# Patient Record
Sex: FEMALE | Race: WHITE | Employment: FULL TIME | URBAN - METROPOLITAN AREA
[De-identification: names, ages, dates, MRNs, and addresses within clinical notes are randomized per-mention and may not be internally consistent; named-entity substitution may affect disease eponyms.]

---

## 2021-12-29 ENCOUNTER — APPOINTMENT (OUTPATIENT)
Dept: GENERAL RADIOLOGY | Age: 26
End: 2021-12-29
Payer: COMMERCIAL

## 2021-12-29 ENCOUNTER — HOSPITAL ENCOUNTER (EMERGENCY)
Age: 26
Discharge: HOME | End: 2021-12-29
Attending: EMERGENCY MEDICINE
Payer: COMMERCIAL

## 2021-12-29 VITALS
SYSTOLIC BLOOD PRESSURE: 125 MMHG | HEIGHT: 62 IN | WEIGHT: 103 LBS | RESPIRATION RATE: 18 BRPM | BODY MASS INDEX: 18.95 KG/M2 | DIASTOLIC BLOOD PRESSURE: 85 MMHG | OXYGEN SATURATION: 99 % | HEART RATE: 80 BPM | TEMPERATURE: 98 F

## 2021-12-29 DIAGNOSIS — S93.602A FOOT SPRAIN, LEFT, INITIAL ENCOUNTER: ICD-10-CM

## 2021-12-29 DIAGNOSIS — R68.89 FLU-LIKE SYMPTOMS: ICD-10-CM

## 2021-12-29 DIAGNOSIS — R55 SYNCOPE AND COLLAPSE: Primary | ICD-10-CM

## 2021-12-29 LAB
ANION GAP SERPL CALCULATED.3IONS-SCNC: 10 MMOL/L (ref 3–16)
BASOPHILS ABSOLUTE: 0 K/UL (ref 0–0.2)
BASOPHILS RELATIVE PERCENT: 0.8 %
BUN BLDV-MCNC: 11 MG/DL (ref 7–20)
CALCIUM SERPL-MCNC: 8.9 MG/DL (ref 8.3–10.6)
CHLORIDE BLD-SCNC: 107 MMOL/L (ref 99–110)
CO2: 24 MMOL/L (ref 21–32)
CREAT SERPL-MCNC: 0.5 MG/DL (ref 0.6–1.1)
EKG ATRIAL RATE: 71 BPM
EKG DIAGNOSIS: NORMAL
EKG P AXIS: -4 DEGREES
EKG P-R INTERVAL: 148 MS
EKG Q-T INTERVAL: 362 MS
EKG QRS DURATION: 88 MS
EKG QTC CALCULATION (BAZETT): 393 MS
EKG R AXIS: 257 DEGREES
EKG T AXIS: 53 DEGREES
EKG VENTRICULAR RATE: 71 BPM
EOSINOPHILS ABSOLUTE: 0.1 K/UL (ref 0–0.6)
EOSINOPHILS RELATIVE PERCENT: 1 %
GFR AFRICAN AMERICAN: >60
GFR NON-AFRICAN AMERICAN: >60
GLUCOSE BLD-MCNC: 86 MG/DL (ref 70–99)
HCG QUALITATIVE: NEGATIVE
HCT VFR BLD CALC: 39.6 % (ref 36–48)
HEMOGLOBIN: 13.4 G/DL (ref 12–16)
LYMPHOCYTES ABSOLUTE: 1.5 K/UL (ref 1–5.1)
LYMPHOCYTES RELATIVE PERCENT: 25.1 %
MCH RBC QN AUTO: 31.5 PG (ref 26–34)
MCHC RBC AUTO-ENTMCNC: 33.7 G/DL (ref 31–36)
MCV RBC AUTO: 93.5 FL (ref 80–100)
MONOCYTES ABSOLUTE: 0.5 K/UL (ref 0–1.3)
MONOCYTES RELATIVE PERCENT: 9 %
NEUTROPHILS ABSOLUTE: 3.7 K/UL (ref 1.7–7.7)
NEUTROPHILS RELATIVE PERCENT: 64.1 %
PDW BLD-RTO: 13.7 % (ref 12.4–15.4)
PLATELET # BLD: 238 K/UL (ref 135–450)
PMV BLD AUTO: 7 FL (ref 5–10.5)
POTASSIUM REFLEX MAGNESIUM: 4.2 MMOL/L (ref 3.5–5.1)
RBC # BLD: 4.24 M/UL (ref 4–5.2)
SODIUM BLD-SCNC: 141 MMOL/L (ref 136–145)
WBC # BLD: 5.8 K/UL (ref 4–11)

## 2021-12-29 PROCEDURE — U0003 INFECTIOUS AGENT DETECTION BY NUCLEIC ACID (DNA OR RNA); SEVERE ACUTE RESPIRATORY SYNDROME CORONAVIRUS 2 (SARS-COV-2) (CORONAVIRUS DISEASE [COVID-19]), AMPLIFIED PROBE TECHNIQUE, MAKING USE OF HIGH THROUGHPUT TECHNOLOGIES AS DESCRIBED BY CMS-2020-01-R: HCPCS

## 2021-12-29 PROCEDURE — 80048 BASIC METABOLIC PNL TOTAL CA: CPT

## 2021-12-29 PROCEDURE — 73630 X-RAY EXAM OF FOOT: CPT

## 2021-12-29 PROCEDURE — 96374 THER/PROPH/DIAG INJ IV PUSH: CPT

## 2021-12-29 PROCEDURE — 84703 CHORIONIC GONADOTROPIN ASSAY: CPT

## 2021-12-29 PROCEDURE — 6360000002 HC RX W HCPCS: Performed by: EMERGENCY MEDICINE

## 2021-12-29 PROCEDURE — 93005 ELECTROCARDIOGRAM TRACING: CPT | Performed by: EMERGENCY MEDICINE

## 2021-12-29 PROCEDURE — 99284 EMERGENCY DEPT VISIT MOD MDM: CPT

## 2021-12-29 PROCEDURE — U0005 INFEC AGEN DETEC AMPLI PROBE: HCPCS

## 2021-12-29 PROCEDURE — 85025 COMPLETE CBC W/AUTO DIFF WBC: CPT

## 2021-12-29 PROCEDURE — 73610 X-RAY EXAM OF ANKLE: CPT

## 2021-12-29 RX ORDER — KETOROLAC TROMETHAMINE 30 MG/ML
15 INJECTION, SOLUTION INTRAMUSCULAR; INTRAVENOUS ONCE
Status: COMPLETED | OUTPATIENT
Start: 2021-12-29 | End: 2021-12-29

## 2021-12-29 RX ORDER — NAPROXEN 500 MG/1
500 TABLET ORAL 2 TIMES DAILY WITH MEALS
Qty: 28 TABLET | Refills: 0 | Status: SHIPPED | OUTPATIENT
Start: 2021-12-29 | End: 2022-03-16 | Stop reason: ALTCHOICE

## 2021-12-29 RX ADMIN — KETOROLAC TROMETHAMINE 15 MG: 30 INJECTION, SOLUTION INTRAMUSCULAR; INTRAVENOUS at 19:55

## 2021-12-29 ASSESSMENT — PAIN SCALES - GENERAL
PAINLEVEL_OUTOF10: 6
PAINLEVEL_OUTOF10: 6

## 2021-12-29 ASSESSMENT — ENCOUNTER SYMPTOMS
RHINORRHEA: 1
SHORTNESS OF BREATH: 0
VOMITING: 0
EYES NEGATIVE: 1
ABDOMINAL PAIN: 0
COUGH: 0
RESPIRATORY NEGATIVE: 1
GASTROINTESTINAL NEGATIVE: 1
NAUSEA: 0

## 2021-12-29 ASSESSMENT — PAIN DESCRIPTION - PAIN TYPE: TYPE: ACUTE PAIN

## 2021-12-29 ASSESSMENT — PAIN DESCRIPTION - DESCRIPTORS: DESCRIPTORS: ACHING;THROBBING

## 2021-12-29 ASSESSMENT — PAIN DESCRIPTION - ORIENTATION: ORIENTATION: LEFT

## 2021-12-29 ASSESSMENT — PAIN DESCRIPTION - FREQUENCY: FREQUENCY: CONTINUOUS

## 2021-12-29 ASSESSMENT — PAIN DESCRIPTION - LOCATION: LOCATION: ANKLE

## 2021-12-29 NOTE — ED PROVIDER NOTES
[address] [facility]          ATTENDING PHYSICIAN NOTE       Date of evaluation: 12/29/2021    Chief Complaint     Loss of Consciousness (patient passed out last night while making dinner, per mother she was out for about a minute) and Ankle Pain (left, got it stuck under fridge when she fell)      History of Present Illness     Kurt Fiore is a 32 y.o. female who presents with left foot pain after a syncopal episode and fall last evening. The patient does not recall the events, but her mother, who is present at the bedside, witnessed the events. She is able to describe that the patient was in the process of making dinner, was putting something back in the refrigerator, when she toppled over and fell to the ground, unconscious. In the process of this, her left foot got stuck on the refrigerator, and was twisted. The patient's mother states that she was unconscious for a few minutes, and upon awakening was able to get up off the ground, and went back to her room, and complained only of foot and ankle pain. At that time she declined medical evaluation. Patient described that today she had another episode of near syncope, but she was seated in her bed at the time, and she laid her head back and the feeling passed. She does note that she has been feeling generally fatigued, with some URI symptoms over the last couple of days, and was noted to have a low-grade fever last evening around the time of her syncope. The patient has recently moved back to the [facility] from [place]. She describes that she contracted a relatively severe case of COVID-19 in January 2021, and although she did not require hospitalization, she had persistent symptoms for several weeks. Since that time, she has experienced numerous episodes of syncope and presyncope, the etiology of which is uncertain, but is believed to be related to her prior COVID-19 infection.   These records are in [place], and are not available for review at this time. She currently complains of pain over the dorsal lateral aspect of the left midfoot, which she rates 6 out of 10 in intensity, worse in the area is palpated or when she moves the foot, and particularly when she ambulates. She does note a history of prior reconstructive foot surgery as a child, but denies any pain in the region of the calcaneus or arch of the foot. Review of Systems     Review of Systems   Constitutional: Positive for activity change, appetite change, chills and fatigue. HENT: Positive for congestion and rhinorrhea. Eyes: Negative. Negative for visual disturbance. Respiratory: Negative. Negative for cough and shortness of breath. Cardiovascular: Negative. Negative for chest pain. Gastrointestinal: Negative. Negative for abdominal pain, nausea and vomiting. Genitourinary: Negative. Negative for difficulty urinating and dysuria. Musculoskeletal: Negative. Skin: Negative. Neurological: Positive for syncope and light-headedness. Negative for speech difficulty, weakness and numbness. Psychiatric/Behavioral: Negative. Past Medical, Surgical, Family, and Social History     She has no past medical history on file. She has no past surgical history on file. Her family history is not on file. She reports that she has never smoked. She has never used smokeless tobacco. She reports current alcohol use. She reports current drug use. Drug: Marijuana Samantha Bryant). Medications     Previous Medications    No medications on file       Allergies     She is allergic to morphine. Physical Exam     INITIAL VITALS: BP: 127/89, Temp: 98 °F (36.7 °C), Pulse: 80, Resp: 15, SpO2: 99 %     General: Well appearing, slender young woman. Pleasantly conversational, and in NAD. HEENT: Head is atraumatic, normocephalic. Pupils are equal, round, and reactive to light. Extraocular muscles are intact.   Conjunctivae are clear and moist. No redness or drainage from the eyes. No drainage from the nose. The oropharynx appeared to be normal.    Neck: Supple, with full range of motion. No midline C-spine tenderness to palpation, crepitus, or step-offs. Back: No CVA tenderness. No midline T or L spine tenderness to palpation, crepitus, or step-offs. Chest: Not tender to palpation. Cardiovascular: Normal S1-S2 without murmur rub or gallop. 2+ radial pulses bilaterally. 2+ DP pulses bilaterally. Respiratory: Unlabored breathing with equal chest rise and fall. Lungs are clear to auscultation bilaterally. No adventitious lung sounds heard. Abdomen: Soft and nontender, without guarding or rebound tenderness. No masses or hepatosplenomegaly. Skin: Warm and dry. Neuro: Alert and oriented x3. No focal neurologic deficits are noted. Extremities: Warm and well-perfused. The patient moves all extremities equally. On focused examination of the right foot and ankle, there is an area of focal edema and developing ecchymosis over the dorsal lateral aspect of the left midfoot. There is tenderness to palpation in that area. There is no tenderness over the distal tibia or fibula, or inferior to the medial or lateral malleolus. No tenderness over the Achilles or calcaneus. No tenderness of the arch of the foot or toes. Toes are neurovascularly intact distally. Psych: The patient's mood and affect are generally within normal limits for their presentation. Diagnostic Results     EKG   Normal sinus rhythm with a ventricular rate of 71 bpm.  Rightward axis. Normal intervals, CO interval 148 ms, QRS duration 80 ms,  ms. No ST segment or T wave normalities are noted. There is no prior EKG in our system to compare to. RADIOLOGY:  XR FOOT LEFT (MIN 3 VIEWS)   Final Result      Left ankle: No acute osseous findings. Left foot: ORIF of the distal calcaneus without evidence of hardware complication or other acute osseous findings. XR ANKLE LEFT (MIN 3 VIEWS)   Final Result      Left ankle: No acute osseous findings. Left foot: ORIF of the distal calcaneus without evidence of hardware complication or other acute osseous findings. LABS:   Results for orders placed or performed during the hospital encounter of 12/29/21   CBC auto differential   Result Value Ref Range    WBC 5.8 4.0 - 11.0 K/uL    RBC 4.24 4.00 - 5.20 M/uL    Hemoglobin 13.4 12.0 - 16.0 g/dL    Hematocrit 39.6 36.0 - 48.0 %    MCV 93.5 80.0 - 100.0 fL    MCH 31.5 26.0 - 34.0 pg    MCHC 33.7 31.0 - 36.0 g/dL    RDW 13.7 12.4 - 15.4 %    Platelets 539 533 - 343 K/uL    MPV 7.0 5.0 - 10.5 fL    Neutrophils % 64.1 %    Lymphocytes % 25.1 %    Monocytes % 9.0 %    Eosinophils % 1.0 %    Basophils % 0.8 %    Neutrophils Absolute 3.7 1.7 - 7.7 K/uL    Lymphocytes Absolute 1.5 1.0 - 5.1 K/uL    Monocytes Absolute 0.5 0.0 - 1.3 K/uL    Eosinophils Absolute 0.1 0.0 - 0.6 K/uL    Basophils Absolute 0.0 0.0 - 0.2 K/uL   Basic Metabolic Panel w/ Reflex to MG   Result Value Ref Range    Sodium 141 136 - 145 mmol/L    Potassium reflex Magnesium 4.2 3.5 - 5.1 mmol/L    Chloride 107 99 - 110 mmol/L    CO2 24 21 - 32 mmol/L    Anion Gap 10 3 - 16    Glucose 86 70 - 99 mg/dL    BUN 11 7 - 20 mg/dL    CREATININE 0.5 (L) 0.6 - 1.1 mg/dL    GFR Non-African American >60 >60    GFR African American >60 >60    Calcium 8.9 8.3 - 10.6 mg/dL   EKG 12 Lead   Result Value Ref Range    Ventricular Rate 71 BPM    Atrial Rate 71 BPM    P-R Interval 148 ms    QRS Duration 88 ms    Q-T Interval 362 ms    QTc Calculation (Bazett) 393 ms    P Axis -4 degrees    R Axis 257 degrees    T Axis 53 degrees    Diagnosis       EKG performed in ER and to be interpreted by ER physician. Confirmed by MD, ER ([id number]),  Matthew Casiano ([phone]) on 12/29/2021 6:32:58 PM         RECENT VITALS:  BP: 127/89, Temp: 98 °F (36.7 °C), Pulse: 80, Resp: 15, SpO2: 99 %     Procedures       ED Course     Nursing Notes, Past Medical Hx, Past Surgical Hx, Social Hx, Allergies, and Family Hx were reviewed. The patient was given the following medications:  Orders Placed This Encounter   Medications    ketorolac (TORADOL) injection 15 mg    naproxen (NAPROSYN) 500 MG tablet     Sig: Take 1 tablet by mouth 2 times daily (with meals)     Dispense:  28 tablet     Refill:  0       CONSULTS:  None    MEDICAL DECISION MAKING / ASSESSMENT / Che Holbrook is a 32 y.o. female, previously generally healthy, although suffering from chronic fatigue, poor appetite, and recurrent syncopal episodes ever since infection with COVID-19 in January 2021, who presents to the emergency department after what appears to have been an unprovoked syncopal episode while cooking dinner in the kitchen last evening. She had a near syncopal episode while seated on her bed earlier today as well, but aside from that denies any current dizziness or lightheadedness, headaches, or any other neurologic symptoms. She had an injury to her left foot during the syncopal episode yesterday, with physical exam findings that are consistent with a dorsolateral left foot sprain, without evidence of underlying bony abnormality on plain films. She was given ice, Ace wrap, crutches, and a dose of Toradol in the emergency department for her current symptoms, and will be discharged with a prescription for naproxen and self-care instructions for a foot sprain. Although the patient states that she has had numerous work-ups for her recurrent syncopal episodes in [place], none of these records are available. Basic laboratory evaluation and EKG were performed in the emergency department, which have been unrevealing. Patient is eating and drinking essentially normally at this time, per her report, although for several months after her COVID-19 diagnosis had a very poor appetite and lost a great deal of weight.     Patient does not have a primary care provider in the [place] area, and was given a referral to the [facility] medical clinic for further work-up of her recurrent syncopal episodes, which may be post Covid in nature. She was also advised that she may wish to discuss with her primary care provider, once established, referral to the [facility] post Covid clinic, which was developed this summer for symptoms in her situation with persistent unusual symptoms after Covid infection. The patient has had some URI symptoms over the past few days, and had a low-grade fever reportedly at home after her syncopal episode, so flu swab and Covid swab were performed in the emergency department, and these are pending. However, the patient is not feeling short of breath, has normal vital signs, and would have no indication for admission or either been positive. She is comfortable for discharge to arrange outpatient follow-up, in the company of her mother, who is present at the bedside. Clinical Impression     1. Syncope and collapse    2. Flu-like symptoms    3. Foot sprain, left, initial encounter        Disposition     PATIENT REFERRED TO:  [facility] [id number]  [phone]    Schedule an appointment as soon as possible for a visit   for follow-up regarding your recurrant syncope episodes      DISCHARGE MEDICATIONS:  New Prescriptions    NAPROXEN (NAPROSYN) 500 MG TABLET    Take 1 tablet by mouth 2 times daily (with meals)       DISPOSITION Decision To Discharge    (Please note that portions of this note were completed with voice recognition software.   Efforts were made to edit the dictations but occasionally words are mis-transcribed.)     William Grande MD  12/29/21 3281

## 2021-12-30 LAB — SARS-COV-2: NOT DETECTED

## 2022-01-04 ENCOUNTER — TELEPHONE (OUTPATIENT)
Dept: INTERNAL MEDICINE CLINIC | Age: 27
End: 2022-01-04

## 2022-03-16 ENCOUNTER — APPOINTMENT (OUTPATIENT)
Dept: CT IMAGING | Age: 27
End: 2022-03-16
Payer: COMMERCIAL

## 2022-03-16 ENCOUNTER — NURSE TRIAGE (OUTPATIENT)
Dept: OTHER | Facility: CLINIC | Age: 27
End: 2022-03-16

## 2022-03-16 ENCOUNTER — HOSPITAL ENCOUNTER (EMERGENCY)
Age: 27
Discharge: HOME | End: 2022-03-16
Attending: EMERGENCY MEDICINE
Payer: COMMERCIAL

## 2022-03-16 VITALS
BODY MASS INDEX: 18.95 KG/M2 | HEART RATE: 82 BPM | HEIGHT: 62 IN | OXYGEN SATURATION: 100 % | DIASTOLIC BLOOD PRESSURE: 66 MMHG | WEIGHT: 103 LBS | SYSTOLIC BLOOD PRESSURE: 101 MMHG | RESPIRATION RATE: 15 BRPM | TEMPERATURE: 98.6 F

## 2022-03-16 DIAGNOSIS — B27.99 INFECTIOUS MONONUCLEOSIS, WITH OTHER COMPLICATION, INFECTIOUS MONONUCLEOSIS DUE TO UNSPECIFIED ORGANISM: ICD-10-CM

## 2022-03-16 DIAGNOSIS — R10.30 LOWER ABDOMINAL PAIN: ICD-10-CM

## 2022-03-16 DIAGNOSIS — R59.0 INGUINAL LYMPHADENOPATHY: Primary | ICD-10-CM

## 2022-03-16 LAB
ALBUMIN SERPL-MCNC: 4.4 G/DL (ref 3.4–5)
ALP BLD-CCNC: 250 U/L (ref 40–129)
ALT SERPL-CCNC: 82 U/L (ref 10–40)
ANION GAP SERPL CALCULATED.3IONS-SCNC: 10 MMOL/L (ref 3–16)
AST SERPL-CCNC: 89 U/L (ref 15–37)
ATYPICAL LYMPHOCYTE RELATIVE PERCENT: 18 % (ref 0–6)
BACTERIA WET PREP: NORMAL
BACTERIA: ABNORMAL /HPF
BASE EXCESS VENOUS: 3.6 MMOL/L (ref -2–3)
BASOPHILS ABSOLUTE: 0 K/UL (ref 0–0.2)
BASOPHILS RELATIVE PERCENT: 1 %
BILIRUB SERPL-MCNC: 0.5 MG/DL (ref 0–1)
BILIRUBIN DIRECT: <0.2 MG/DL (ref 0–0.3)
BILIRUBIN URINE: ABNORMAL
BILIRUBIN, INDIRECT: ABNORMAL MG/DL (ref 0–1)
BLOOD, URINE: NEGATIVE
BUN BLDV-MCNC: 9 MG/DL (ref 7–20)
CALCIUM SERPL-MCNC: 9.7 MG/DL (ref 8.3–10.6)
CARBOXYHEMOGLOBIN: 1.6 % (ref 0–1.5)
CHLORIDE BLD-SCNC: 99 MMOL/L (ref 99–110)
CLARITY: ABNORMAL
CLUE CELLS: NORMAL
CO2: 30 MMOL/L (ref 21–32)
COLOR: YELLOW
CREAT SERPL-MCNC: 0.8 MG/DL (ref 0.6–1.1)
EOSINOPHILS ABSOLUTE: 0 K/UL (ref 0–0.6)
EOSINOPHILS RELATIVE PERCENT: 0 %
EPITHELIAL CELLS WET PREP: NORMAL
EPITHELIAL CELLS, UA: ABNORMAL /HPF (ref 0–5)
GFR AFRICAN AMERICAN: >60
GFR NON-AFRICAN AMERICAN: >60
GLUCOSE BLD-MCNC: 97 MG/DL (ref 70–99)
GLUCOSE URINE: NEGATIVE MG/DL
HCG QUALITATIVE: NEGATIVE
HCO3 VENOUS: 30.3 MMOL/L (ref 24–28)
HCT VFR BLD CALC: 43.5 % (ref 36–48)
HEMATOLOGY PATH CONSULT: NORMAL
HEMATOLOGY PATH CONSULT: YES
HEMOGLOBIN, VEN, REDUCED: 79.3 %
HEMOGLOBIN: 14.8 G/DL (ref 12–16)
KETONES, URINE: ABNORMAL MG/DL
LACTIC ACID: 1 MMOL/L (ref 0.4–2)
LEUKOCYTE ESTERASE, URINE: NEGATIVE
LYMPHOCYTES ABSOLUTE: 2.2 K/UL (ref 1–5.1)
LYMPHOCYTES RELATIVE PERCENT: 42 %
MAGNESIUM: 1.9 MG/DL (ref 1.8–2.4)
MCH RBC QN AUTO: 28.8 PG (ref 26–34)
MCHC RBC AUTO-ENTMCNC: 34.1 G/DL (ref 31–36)
MCV RBC AUTO: 84.3 FL (ref 80–100)
METHEMOGLOBIN VENOUS: 0.5 % (ref 0–1.5)
MICROSCOPIC EXAMINATION: YES
MONO TEST: POSITIVE
MONOCYTES ABSOLUTE: 0.4 K/UL (ref 0–1.3)
MONOCYTES RELATIVE PERCENT: 12 %
NEUTROPHILS ABSOLUTE: 1 K/UL (ref 1.7–7.7)
NEUTROPHILS RELATIVE PERCENT: 27 %
NITRITE, URINE: POSITIVE
O2 SAT, VEN: 19 %
PCO2, VEN: 52.6 MMHG (ref 41–51)
PDW BLD-RTO: 13.8 % (ref 12.4–15.4)
PH UA: 6.5 (ref 5–8)
PH VENOUS: 7.37 (ref 7.35–7.45)
PLATELET # BLD: 138 K/UL (ref 135–450)
PLATELET SLIDE REVIEW: ADEQUATE
PMV BLD AUTO: 7.8 FL (ref 5–10.5)
PO2, VEN: <30 MMHG (ref 25–40)
POTASSIUM REFLEX MAGNESIUM: 3.4 MMOL/L (ref 3.5–5.1)
PROTEIN UA: ABNORMAL MG/DL
RBC # BLD: 5.16 M/UL (ref 4–5.2)
RBC # BLD: NORMAL 10*6/UL
RBC UA: ABNORMAL /HPF (ref 0–4)
RBC WET PREP: NORMAL
SODIUM BLD-SCNC: 139 MMOL/L (ref 136–145)
SOURCE WET PREP: NORMAL
SPECIFIC GRAVITY UA: 1.01 (ref 1–1.03)
TCO2 CALC VENOUS: 32 MMOL/L
TOTAL PROTEIN: 7.2 G/DL (ref 6.4–8.2)
TRICHOMONAS PREP: NORMAL
URINE TYPE: ABNORMAL
UROBILINOGEN, URINE: 1 E.U./DL
WBC # BLD: 3.6 K/UL (ref 4–11)
WBC UA: ABNORMAL /HPF (ref 0–5)
WBC WET PREP: NORMAL
YEAST WET PREP: NORMAL

## 2022-03-16 PROCEDURE — 84703 CHORIONIC GONADOTROPIN ASSAY: CPT

## 2022-03-16 PROCEDURE — 96360 HYDRATION IV INFUSION INIT: CPT

## 2022-03-16 PROCEDURE — 85025 COMPLETE CBC W/AUTO DIFF WBC: CPT

## 2022-03-16 PROCEDURE — 80076 HEPATIC FUNCTION PANEL: CPT

## 2022-03-16 PROCEDURE — 83605 ASSAY OF LACTIC ACID: CPT

## 2022-03-16 PROCEDURE — 2580000003 HC RX 258: Performed by: PHYSICIAN ASSISTANT

## 2022-03-16 PROCEDURE — 80048 BASIC METABOLIC PNL TOTAL CA: CPT

## 2022-03-16 PROCEDURE — 81001 URINALYSIS AUTO W/SCOPE: CPT

## 2022-03-16 PROCEDURE — 96372 THER/PROPH/DIAG INJ SC/IM: CPT

## 2022-03-16 PROCEDURE — 99285 EMERGENCY DEPT VISIT HI MDM: CPT

## 2022-03-16 PROCEDURE — 83735 ASSAY OF MAGNESIUM: CPT

## 2022-03-16 PROCEDURE — 87491 CHLMYD TRACH DNA AMP PROBE: CPT

## 2022-03-16 PROCEDURE — 82803 BLOOD GASES ANY COMBINATION: CPT

## 2022-03-16 PROCEDURE — 6360000002 HC RX W HCPCS: Performed by: PHYSICIAN ASSISTANT

## 2022-03-16 PROCEDURE — 74177 CT ABD & PELVIS W/CONTRAST: CPT

## 2022-03-16 PROCEDURE — 86308 HETEROPHILE ANTIBODY SCREEN: CPT

## 2022-03-16 PROCEDURE — 87210 SMEAR WET MOUNT SALINE/INK: CPT

## 2022-03-16 PROCEDURE — 6360000004 HC RX CONTRAST MEDICATION: Performed by: EMERGENCY MEDICINE

## 2022-03-16 PROCEDURE — 87591 N.GONORRHOEAE DNA AMP PROB: CPT

## 2022-03-16 PROCEDURE — 36415 COLL VENOUS BLD VENIPUNCTURE: CPT

## 2022-03-16 RX ORDER — PROMETHAZINE HYDROCHLORIDE 25 MG/ML
12.5 INJECTION, SOLUTION INTRAMUSCULAR; INTRAVENOUS ONCE
Status: COMPLETED | OUTPATIENT
Start: 2022-03-16 | End: 2022-03-16

## 2022-03-16 RX ORDER — CEPHALEXIN 500 MG/1
500 CAPSULE ORAL 4 TIMES DAILY
Qty: 28 CAPSULE | Refills: 0 | Status: SHIPPED | OUTPATIENT
Start: 2022-03-16 | End: 2022-03-23

## 2022-03-16 RX ORDER — SODIUM CHLORIDE, SODIUM LACTATE, POTASSIUM CHLORIDE, AND CALCIUM CHLORIDE .6; .31; .03; .02 G/100ML; G/100ML; G/100ML; G/100ML
1000 INJECTION, SOLUTION INTRAVENOUS ONCE
Status: COMPLETED | OUTPATIENT
Start: 2022-03-16 | End: 2022-03-16

## 2022-03-16 RX ADMIN — PROMETHAZINE HYDROCHLORIDE 12.5 MG: 25 INJECTION INTRAMUSCULAR; INTRAVENOUS at 10:13

## 2022-03-16 RX ADMIN — IOPAMIDOL 80 ML: 755 INJECTION, SOLUTION INTRAVENOUS at 10:41

## 2022-03-16 RX ADMIN — SODIUM CHLORIDE, POTASSIUM CHLORIDE, SODIUM LACTATE AND CALCIUM CHLORIDE 1000 ML: 600; 310; 30; 20 INJECTION, SOLUTION INTRAVENOUS at 10:09

## 2022-03-16 ASSESSMENT — PAIN DESCRIPTION - LOCATION: LOCATION: ABDOMEN;GROIN

## 2022-03-16 ASSESSMENT — PAIN SCALES - GENERAL
PAINLEVEL_OUTOF10: 8
PAINLEVEL_OUTOF10: 6

## 2022-03-16 ASSESSMENT — PAIN DESCRIPTION - ORIENTATION: ORIENTATION: LEFT;RIGHT

## 2022-03-16 ASSESSMENT — PAIN DESCRIPTION - PAIN TYPE: TYPE: ACUTE PAIN

## 2022-03-16 ASSESSMENT — PAIN - FUNCTIONAL ASSESSMENT
PAIN_FUNCTIONAL_ASSESSMENT: 0-10
PAIN_FUNCTIONAL_ASSESSMENT: 0-10

## 2022-03-16 NOTE — ED PROVIDER NOTES
[address] ENCOUNTER          PHYSICIAN ASSISTANT NOTE     Date of evaluation: 3/16/2022    Chief Complaint     Abdominal Pain, Groin Pain, and Fever      History of Present Illness     Ilene Perry is a 32 y.o. female with a history of daily marijuana use presents today for evaluation of swollen lymph nodes, fever, fatigue, nausea, and abdominal pain. Patient states over the past week she noticed some painful, swollen lymph nodes in her left groin approaching the vaginal labia. It later involve the right inguinal lymph nodes to a lesser degree. With the small lymph node she began noticing on and off fevers (max temperature 100.8), sweats throughout the day and at night, progressive onset of nausea, and persistent lower abdominal pain. The pain is described as aching and throbbing. It does not radiate to the back. She has not taken medications for her pain or her nausea. When she lays flat, she feels like her lower abdomen is distended. She denies a history of similar symptoms. She states she has a small amount of vaginal spotting almost daily, especially for the past 4 weeks, due to chronic Nexplanon insertion. She is not concerned for pregnancy. She specifically denies any vaginal discharge, burning with urination, blood in her urine, concern for pregnancy, concern for STDs, swelling of her thigh/lower extremities. She was diagnosed with COVID-19 1/2021 and has felt \"off\" since that time but states the symptoms of this past week are entirely different than her post Covid symptoms. Review of Systems     A complete review of systems was performed and negative except as stated in the HPI. Past Medical, Surgical, Family, and Social History     She has no past medical history on file. She has no past surgical history on file. Her family history is not on file. She reports that she has never smoked.  She has never used smokeless tobacco. She reports current alcohol use. She reports current drug use. Drug: Marijuana Gelene Chasten). Medications     Previous Medications    No medications on file       Allergies     She is allergic to morphine and ondansetron. Physical Exam     INITIAL VITALS: BP: 134/80, Temp: 98.2 °F (36.8 °C), Pulse: 123, Resp: 18, SpO2: 100 %     General: Thin, pale appearing, nontoxic female    HEENT:  Normocephalic, atraumatic. Pupils equal, sclera white. Handling secretions without difficulty. Neck: No meningismus. Trachea midline    Pulmonary: Respirations even. Non labored. No tachypnea. Cardiac: Chest symmetrical and non-tender on palpation of chest wall. Abdomen: Slight distention and firmness in the lower abdomen in the periumbilical and suprapubic region. There is a palpable pulsatile abdominal aorta likely consistent with patient's small body habitus. There is no rebound tenderness. Negative Bocanegra sign. No pain McBurney's point. No pain left upper quadrant. No flank tenderness on percussion. With supervision of the nurse, Santosh Haynes, there is a quarter size, mobile, tender left-sided inguinal lymph node approaching the vulva. Slight reactive lymphadenopathy on the right inguinal crease and the superior left inguinal crease. No surrounding erythema, crepitus, or fluctuance. : Performed with female  [address] bedside as chaperone. No vaginal ulceration or lacerations. Vaginal mucosa is pink and moist.  No discharge noted to vaginal canal or posterior vaginal vault. Cervix appears normal without lesions or friability. No cervical os discharge. No cervical motion tenderness. No adnexal fullness, masses or tenderness. Musculoskeletal:  Ambulates under own control. No peripheral edema. Neuro:  Alert and oriented x 3. CN II - XII grossly intact. Moves all extremities spontaneously.     Vascular:  2+ peripheral pulses in bilateral upper and lower extremities      Skin:  Warm and well perfused without rashes or lesions    Psych: Appropriate mood and affect        Diagnostic Results     EKG   Interpreted in conjunction with emergency department physician Gill Boyle MD    RADIOLOGY:  CT ABDOMEN PELVIS W IV CONTRAST Additional Contrast? None   Final Result   1. Visualized bilateral inguinal and external iliac chain nodes, as above. These are most likely reactive. Clinical correlation is necessary.              LABS:   Results for orders placed or performed during the hospital encounter of 03/16/22   Wet prep, genital    Specimen: Vaginal   Result Value Ref Range    Trichomonas Prep None Seen     Yeast, Wet Prep None Seen     Clue Cells, Wet Prep None Seen     WBC, Wet Prep <1+     RBC, Wet Prep None Seen     Epi Cells 1+     Bacteria <1+     Source Wet Prep Vaginal    CBC with Auto Differential   Result Value Ref Range    WBC 3.6 (L) 4.0 - 11.0 K/uL    RBC 5.16 4.00 - 5.20 M/uL    Hemoglobin 14.8 12.0 - 16.0 g/dL    Hematocrit 43.5 36.0 - 48.0 %    MCV 84.3 80.0 - 100.0 fL    MCH 28.8 26.0 - 34.0 pg    MCHC 34.1 31.0 - 36.0 g/dL    RDW 13.8 12.4 - 15.4 %    Platelets 156 350 - 276 K/uL    MPV 7.8 5.0 - 10.5 fL    PLATELET SLIDE REVIEW Adequate     Path Consult Yes     Neutrophils % 27.0 %    Lymphocytes % 42.0 %    Monocytes % 12.0 %    Eosinophils % 0.0 %    Basophils % 1.0 %    Neutrophils Absolute 1.0 (L) 1.7 - 7.7 K/uL    Lymphocytes Absolute 2.2 1.0 - 5.1 K/uL    Monocytes Absolute 0.4 0.0 - 1.3 K/uL    Eosinophils Absolute 0.0 0.0 - 0.6 K/uL    Basophils Absolute 0.0 0.0 - 0.2 K/uL    Atypical Lymphocytes Relative 18 (H) 0 - 6 %    RBC Morphology Normal    Basic Metabolic Panel w/ Reflex to MG   Result Value Ref Range    Sodium 139 136 - 145 mmol/L    Potassium reflex Magnesium 3.4 (L) 3.5 - 5.1 mmol/L    Chloride 99 99 - 110 mmol/L    CO2 30 21 - 32 mmol/L    Anion Gap 10 3 - 16    Glucose 97 70 - 99 mg/dL    BUN 9 7 - 20 mg/dL    CREATININE 0.8 0.6 - 1.1 mg/dL    GFR Non-African American >60 >60    GFR  >60 >60 and urinalysis that is nonspecific with positive nitrite, 6-10 epithelial cells, 4+ bacteria, minimal white cells, and negative leukocyte esterase. pH 7.37, lactic acid within normal limits making sepsis/bacteremia lower on the differential.  CT scan redemonstrated the bilateral inguinal lymph nodes but no other acute pathology. Monospot and HIV and hepatitis panel sent given her transaminitis and symptoms. Monospot was positive. Overall, her vague waxing and waning symptoms are likely caused by mononucleosis/EBV. However, given her inguinal lymphadenopathy with a urinalysis that is possibly infectious we will send for urine culture and treat with Keflex simultaneously. Low suspicion for STDs at this time and will with her prophylactic treatment. Referral to the [address] [place] [address] was placed for follow-up. Strict return precautions were given. I will follow up on her HIV/gonorrhea/chlamydia results and contact her personally if they are positive. Phone number [phone] confirmed with patient. This patient was also evaluated by the attending physician. All care plans were discussed and agreed upon. Some of this note was dictated using voice recognition software. As a result, unintended errors in grammar or spelling may exist.    Clinical Impression     1. Inguinal lymphadenopathy    2. Lower abdominal pain    3.  Infectious mononucleosis, with other complication, infectious mononucleosis due to unspecified organism        Disposition     PATIENT REFERRED TO:  [address]  [phone]    Schedule an appointment as soon as possible for a visit         DISCHARGE MEDICATIONS:  New Prescriptions    CEPHALEXIN (KEFLEX) 500 MG CAPSULE    Take 1 capsule by mouth 4 times daily for 7 days       DISPOSITION Decision To Discharge 03/16/2022 12:45:01 PM        Saint Gentles, PA-C  03/16/22 1732

## 2022-03-16 NOTE — ED NOTES
Pt discharged from ED in stable, ambulatory condition. Discharge instructions explained, all questions answered. Prescriptions given. Pt walked to [facility] independently.        Tania Tilley RN  03/16/22 5075

## 2022-03-16 NOTE — ED TRIAGE NOTES
Pt arrived to ED with swelling in groin for past week, intermittent fevers, and abdominal tenderness that started last night. States she has been getting lightheaded if she stands for too long and noticed her HR is elevated. Her step-mom has been checking her BP and HR at home. She thinks the swelling in groin in swollen lymph nodes.

## 2022-03-16 NOTE — TELEPHONE ENCOUNTER
Received call from  Maxine Corcoran  [facility] [employer] [facility]. Subjective: Caller states \" swollen lymph nodes\"     Current Symptoms:   Multiple  lymph nodes   In groin area  Growing in size larger than a pea on the left side     Onset: 1 week      Associated Symptoms:   Intermittent fevers  Not able to stand with out passing out   Sweating   3 inches wide stomach swelling, tender to the touch  Onset today     Pain Severity:   Sore to the touch     Temperature:  99.6     What has been tried: tylenol     LMP: nexplannon pt has cycle for 1 month  Pregnant: No    Recommended disposition: Go to ED Now   Pt will be going to [facility] advice provided, patient verbalizes understanding; denies any other questions or concerns; instructed to call back for any new or worsening symptoms. Patient/caller agrees to proceed to [facility] Emergency Department     Attention Provider: Thank you for allowing me to participate in the care of your patient. The patient was connected to triage in response to information provided to the ECC/PSC. Please do not respond through this encounter as the response is not directed to a shared pool.       Reason for Disposition   Patient sounds very sick or weak to the triager    Protocols used: LYMPH NODES - SWOLLEN-ADULT-OH

## 2022-03-16 NOTE — ED PROVIDER NOTES
ED Attending Attestation Note     Date of evaluation: 3/16/2022    This patient was seen by the advance practice provider. I have seen and examined the patient, agree with the workup, evaluation, management and diagnosis. The care plan has been discussed. My assessment reveals who presents with bilateral inguinal lymphadenopathy. Patient has had the symptoms for past few days with some lower abdominal discomfort. She also notes dysfunctional uterine bleeding which is not atypical for her. The patient states that she does not have any vaginal discharge but she seemed to have some concerns about possible STI from her boyfriend. She does not have any significant upper abdominal pain, rebound tenderness or involuntary guarding. .     Linda Hall MD  03/16/22 4718

## 2022-03-17 ENCOUNTER — CARE COORDINATION (OUTPATIENT)
Dept: OTHER | Facility: CLINIC | Age: 27
End: 2022-03-17

## 2022-03-17 NOTE — PATIENT INSTRUCTIONS
Patient Education        Mononucleosis: Care Instructions  Your Care Instructions     Mononucleosis, also called mono, is an infection that is usually caused by the Juan Francisco-Barr virus. Mono is spread through contact with saliva, mucus from the nose and throat, and sometimes tears or blood. You can get mono by kissing a person who is infected. Or you may get it by sharing a drinking glass or eating utensils with someone who has mono. That person may not be sick at the time or may have had mono long before. Mono may cause your spleen to swell. The spleen is an organ in the upper left side of your belly. A blow to the belly can cause a swollen spleen to break open. In very rare cases, the spleen may burst on its own. Most people recover fully after several weeks. But it may take several months before your normal energy is back. The lymph nodes in your neck may be larger than normal for up to 1 month. Getting lots of rest and keeping your schedule light will help you feel better. Time helps you recover. Follow-up care is a key part of your treatment and safety. Be sure to make and go to all appointments, and call your doctor if you are having problems. It's also a good idea to know your test results and keep a list of the medicines you take. How can you care for yourself at home? · Get plenty of rest. Stay in bed until you feel well enough to be up. · Drink plenty of fluids. If you have kidney, heart, or liver disease and have to limit fluids, talk with your doctor before you increase the amount of fluids you drink. · Take your medicines exactly as prescribed. Call your doctor if you think you are having a problem with your medicine. · For a sore throat, suck on lozenges or gargle with salt water. To make salt water, mix 1 teaspoon of salt in 8 ounces of warm water.   · Take an over-the-counter pain medicine, such as acetaminophen (Tylenol), ibuprofen (Advil, Motrin), or naproxen (Aleve) for a sore throat or headache or to lower a fever. Read and follow all instructions on the label. · Do not take two or more pain medicines at the same time unless the doctor told you to. Many pain medicines have acetaminophen, which is Tylenol. Too much acetaminophen (Tylenol) can be harmful. · Do not play contact sports for 4 weeks. Do not lift anything heavy. Too much activity increases the chance that your spleen may break open. · Try not to spread the virus to others. Do not kiss or share dishes, glasses, eating utensils, or toothbrushes for at least two weeks. The virus is spread when saliva from an infected person gets in another person's mouth. It is hard to know how long you may be contagious. · If you know you have mono, do not donate blood. There is a chance of spreading the virus through blood products. When should you call for help? Call 911 anytime you think you may need emergency care. For example, call if:    · You passed out (lost consciousness). Call your doctor now or seek immediate medical care if:    · You have new or worse belly pain.     · You have signs of needing more fluids. You have sunken eyes and a dry mouth, and you pass only a little urine.     · You are dizzy or lightheaded, or you feel like you may faint.     · You cannot swallow fluids. Watch closely for changes in your health, and be sure to contact your doctor if:    · You do not get better as expected. Where can you learn more? Go to [email or web address] org and sign in to your [employer] account. Enter R880 in the [facility] box to learn more about \"Mononucleosis: Care Instructions. \"     If you do not have an account, please click on the \"Sign Up Now\" link. Current as of: July 1, 2021               Content Version: 13.1  © 2006-2021 Healthwise, Incorporated. Care instructions adapted under license by [facility] ([facility]).  If you have questions about a medical condition or this instruction, always ask your healthcare professional. [address] any warranty or liability for your use of this information.

## 2022-03-17 NOTE — CARE COORDINATION
Ambulatory Care Coordination Note  3/17/2022  CM Risk Score: 0  Charlson 10 Year Mortality Risk Score: 2%     ACC: Matthew Jarvis RN    Summary Note: [facility]) contacted the patient by telephone to introduced the Associate Care Management Program r/t ED follow up and need for PCP. Verified name and  with patient as identifiers. Patient was agreeable to Associate Care Management. ACM reviewed and updated CC Protocol, medications, goals, education and disease specific assessment. Patient states she is \"doing well\", she reports her fever is \"better today\" and that she is just \"waiting it out\", otherwise symptoms are unchanged per patient report. She has scheduled her follow up appointment with the [facility] outpatient clinic. She confirmed that she does not have a primary care physician and is in need of one. ACM offered to call the Find a Provider Line to get her scheduled with the first available provider or send her a list of providers for her to review and select from. She chose the list of provides. [facility] sent list of PCP providers in her area, covered under her [facility] plan that are accepting new patients to patient via [employer] using the [address] and [facility] [address] a Doctor site. ACM to follow up with patient next week after [facility] appt. Provided Education:  Discussed red flags and appropriate site of care based on symptoms and resources available to patient including: PCP, Benefits related nurse triage line, Urgent care clinics, When to call 911, [employer] and Condition related references. Importance and benefits of: Establishing with PCP, medication adherence, self monitoring and reporting of symptoms. Plan:  Continue weekly outreaches to provide telephonic support, education and resources as needed. Discuss / follow up on: Goal progress (PCP appt scheduling f/u or assistance) symptom assessment.       Pt verbalized understanding and is agreeable to follow up contact. Ambulatory Care Coordination Assessment    Care Coordination Protocol  Program Enrollment: Rising Risk  Referral from Primary Care Provider: No  Week 1 - Initial Assessment     Do you have all of your prescriptions and are they filled?: Yes  Barriers to medication adherence: None  Are you able to afford your medications?: Yes  How often do you have trouble taking your medications the way you have been told to take them?: I always take them as prescribed. Do you have Home O2 Therapy?: No      Ability to seek help/take action for Emergent Urgent situations i.e. fire, crime, inclement weather or health crisis. : Independent  Ability to ambulate to restroom: Independent  Ability handle personal hygeine needs (bathing/dressing/grooming): Independent  Ability to manage Medications: Independent  Ability to prepare Food Preparation: Independent  Ability to maintain home (clean home, laundry): Independent  Ability to drive and/or has transportation: Independent  Ability to do shopping: Independent  Ability to manage finances: Independent  Is patient able to live independently?: Yes           Per the Fall Risk Screening, did the patient have 2 or more falls or 1 fall with injury in the past year?: No     Frequent urination at night?: No  Do you use rails/bars?: No  Do you have a non-slip tub mat?: No        Suggested Interventions and Community Resources   Other Services or Interventions: Sent list of local PCP's          Set up/Review Goals, Set up/Review an Education Plan              Prior to Admission medications    Medication Sig Start Date End Date Taking?  Authorizing Provider   cephALEXin (KEFLEX) 500 MG capsule Take 1 capsule by mouth 4 times daily for 7 days 3/16/22 3/23/22 Yes Jerica Vidal PA-C       Future Appointments   Date Time Provider Sirena Townsend   3/24/2022  3:15 PM Harjit Lyn MD [facility]

## 2022-03-18 LAB
C TRACH DNA GENITAL QL NAA+PROBE: NEGATIVE
N. GONORRHOEAE DNA: NEGATIVE

## 2022-03-24 ENCOUNTER — OFFICE VISIT (OUTPATIENT)
Dept: INTERNAL MEDICINE CLINIC | Age: 27
End: 2022-03-24
Payer: COMMERCIAL

## 2022-03-24 ENCOUNTER — HOSPITAL ENCOUNTER (EMERGENCY)
Age: 27
Discharge: HOME | End: 2022-03-24
Attending: EMERGENCY MEDICINE
Payer: COMMERCIAL

## 2022-03-24 VITALS
TEMPERATURE: 96.6 F | HEIGHT: 62 IN | RESPIRATION RATE: 16 BRPM | HEART RATE: 134 BPM | OXYGEN SATURATION: 99 % | DIASTOLIC BLOOD PRESSURE: 90 MMHG | SYSTOLIC BLOOD PRESSURE: 123 MMHG | WEIGHT: 96.5 LBS | BODY MASS INDEX: 17.76 KG/M2

## 2022-03-24 VITALS
DIASTOLIC BLOOD PRESSURE: 98 MMHG | SYSTOLIC BLOOD PRESSURE: 114 MMHG | RESPIRATION RATE: 18 BRPM | TEMPERATURE: 98.5 F | HEART RATE: 84 BPM | OXYGEN SATURATION: 99 %

## 2022-03-24 DIAGNOSIS — R79.89 ELEVATED LFTS: ICD-10-CM

## 2022-03-24 DIAGNOSIS — R11.2 NAUSEA AND VOMITING, INTRACTABILITY OF VOMITING NOT SPECIFIED, UNSPECIFIED VOMITING TYPE: Primary | ICD-10-CM

## 2022-03-24 DIAGNOSIS — R82.71 ASYMPTOMATIC BACTERIURIA: ICD-10-CM

## 2022-03-24 DIAGNOSIS — B27.90 INFECTIOUS MONONUCLEOSIS WITHOUT COMPLICATION, INFECTIOUS MONONUCLEOSIS DUE TO UNSPECIFIED ORGANISM: Primary | ICD-10-CM

## 2022-03-24 LAB
ALBUMIN SERPL-MCNC: 4 G/DL (ref 3.4–5)
ALP BLD-CCNC: 774 U/L (ref 40–129)
ALT SERPL-CCNC: 219 U/L (ref 10–40)
ANION GAP SERPL CALCULATED.3IONS-SCNC: 13 MMOL/L (ref 3–16)
AST SERPL-CCNC: 139 U/L (ref 15–37)
ATYPICAL LYMPHOCYTE RELATIVE PERCENT: 44 % (ref 0–6)
BACTERIA: ABNORMAL /HPF
BASOPHILS ABSOLUTE: 0.1 K/UL (ref 0–0.2)
BASOPHILS RELATIVE PERCENT: 1 %
BILIRUB SERPL-MCNC: 1 MG/DL (ref 0–1)
BILIRUBIN DIRECT: 0.4 MG/DL (ref 0–0.3)
BILIRUBIN URINE: ABNORMAL
BILIRUBIN, INDIRECT: 0.6 MG/DL (ref 0–1)
BLASTS RELATIVE PERCENT: 4 %
BLOOD, URINE: NEGATIVE
BUN BLDV-MCNC: 8 MG/DL (ref 7–20)
CALCIUM SERPL-MCNC: 8.9 MG/DL (ref 8.3–10.6)
CHLORIDE BLD-SCNC: 103 MMOL/L (ref 99–110)
CLARITY: CLEAR
CO2: 23 MMOL/L (ref 21–32)
COLOR: YELLOW
CREAT SERPL-MCNC: 0.5 MG/DL (ref 0.6–1.1)
EOSINOPHILS ABSOLUTE: 0 K/UL (ref 0–0.6)
EOSINOPHILS RELATIVE PERCENT: 0 %
EPITHELIAL CELLS, UA: ABNORMAL /HPF (ref 0–5)
GFR AFRICAN AMERICAN: >60
GFR NON-AFRICAN AMERICAN: >60
GLUCOSE BLD-MCNC: 91 MG/DL (ref 70–99)
GLUCOSE URINE: NEGATIVE MG/DL
HCG(URINE) PREGNANCY TEST: NEGATIVE
HCT VFR BLD CALC: 41 % (ref 36–48)
HEMATOLOGY PATH CONSULT: YES
HEMOGLOBIN: 13.9 G/DL (ref 12–16)
KETONES, URINE: 15 MG/DL
LEUKOCYTE ESTERASE, URINE: ABNORMAL
LIPASE: 17 U/L (ref 13–60)
LYMPHOCYTES ABSOLUTE: 6.2 K/UL (ref 1–5.1)
LYMPHOCYTES RELATIVE PERCENT: 16 %
MCH RBC QN AUTO: 28.8 PG (ref 26–34)
MCHC RBC AUTO-ENTMCNC: 33.8 G/DL (ref 31–36)
MCV RBC AUTO: 85.2 FL (ref 80–100)
MICROCYTES: ABNORMAL
MICROSCOPIC EXAMINATION: YES
MONOCYTES ABSOLUTE: 1.1 K/UL (ref 0–1.3)
MONOCYTES RELATIVE PERCENT: 11 %
MUCUS: ABNORMAL /LPF
NEUTROPHILS ABSOLUTE: 2.5 K/UL (ref 1.7–7.7)
NEUTROPHILS RELATIVE PERCENT: 24 %
NITRITE, URINE: POSITIVE
PDW BLD-RTO: 14.5 % (ref 12.4–15.4)
PH UA: 6.5 (ref 5–8)
PLATELET # BLD: 209 K/UL (ref 135–450)
PLATELET SLIDE REVIEW: ADEQUATE
PMV BLD AUTO: 6.9 FL (ref 5–10.5)
POTASSIUM SERPL-SCNC: 3.7 MMOL/L (ref 3.5–5.1)
PROTEIN UA: 30 MG/DL
RBC # BLD: 4.82 M/UL (ref 4–5.2)
RBC UA: ABNORMAL /HPF (ref 0–4)
SODIUM BLD-SCNC: 139 MMOL/L (ref 136–145)
SPECIFIC GRAVITY UA: 1.02 (ref 1–1.03)
TOTAL PROTEIN: 7.1 G/DL (ref 6.4–8.2)
URINE TYPE: ABNORMAL
UROBILINOGEN, URINE: 4 E.U./DL
WBC # BLD: 10.3 K/UL (ref 4–11)
WBC UA: ABNORMAL /HPF (ref 0–5)

## 2022-03-24 PROCEDURE — 36415 COLL VENOUS BLD VENIPUNCTURE: CPT

## 2022-03-24 PROCEDURE — 87186 SC STD MICRODIL/AGAR DIL: CPT

## 2022-03-24 PROCEDURE — 96374 THER/PROPH/DIAG INJ IV PUSH: CPT

## 2022-03-24 PROCEDURE — 85025 COMPLETE CBC W/AUTO DIFF WBC: CPT

## 2022-03-24 PROCEDURE — 80048 BASIC METABOLIC PNL TOTAL CA: CPT

## 2022-03-24 PROCEDURE — 87088 URINE BACTERIA CULTURE: CPT

## 2022-03-24 PROCEDURE — 84703 CHORIONIC GONADOTROPIN ASSAY: CPT

## 2022-03-24 PROCEDURE — 99213 OFFICE O/P EST LOW 20 MIN: CPT | Performed by: STUDENT IN AN ORGANIZED HEALTH CARE EDUCATION/TRAINING PROGRAM

## 2022-03-24 PROCEDURE — 99283 EMERGENCY DEPT VISIT LOW MDM: CPT

## 2022-03-24 PROCEDURE — 83690 ASSAY OF LIPASE: CPT

## 2022-03-24 PROCEDURE — 87086 URINE CULTURE/COLONY COUNT: CPT

## 2022-03-24 PROCEDURE — 80076 HEPATIC FUNCTION PANEL: CPT

## 2022-03-24 PROCEDURE — 2580000003 HC RX 258: Performed by: NURSE PRACTITIONER

## 2022-03-24 PROCEDURE — 81001 URINALYSIS AUTO W/SCOPE: CPT

## 2022-03-24 PROCEDURE — 6360000002 HC RX W HCPCS: Performed by: NURSE PRACTITIONER

## 2022-03-24 RX ORDER — SODIUM CHLORIDE, SODIUM LACTATE, POTASSIUM CHLORIDE, CALCIUM CHLORIDE 600; 310; 30; 20 MG/100ML; MG/100ML; MG/100ML; MG/100ML
1000 INJECTION, SOLUTION INTRAVENOUS ONCE
Status: COMPLETED | OUTPATIENT
Start: 2022-03-24 | End: 2022-03-24

## 2022-03-24 RX ORDER — METOCLOPRAMIDE HYDROCHLORIDE 5 MG/ML
10 INJECTION INTRAMUSCULAR; INTRAVENOUS ONCE
Status: COMPLETED | OUTPATIENT
Start: 2022-03-24 | End: 2022-03-24

## 2022-03-24 RX ORDER — PROMETHAZINE HYDROCHLORIDE 25 MG/1
25 TABLET ORAL EVERY 6 HOURS PRN
Qty: 20 TABLET | Refills: 0 | Status: SHIPPED | OUTPATIENT
Start: 2022-03-24 | End: 2022-03-31

## 2022-03-24 RX ADMIN — SODIUM CHLORIDE, POTASSIUM CHLORIDE, SODIUM LACTATE AND CALCIUM CHLORIDE 1000 ML: 600; 310; 30; 20 INJECTION, SOLUTION INTRAVENOUS at 17:38

## 2022-03-24 RX ADMIN — METOCLOPRAMIDE 10 MG: 5 INJECTION, SOLUTION INTRAMUSCULAR; INTRAVENOUS at 17:39

## 2022-03-24 ASSESSMENT — ENCOUNTER SYMPTOMS
NAUSEA: 1
CHEST TIGHTNESS: 0
COUGH: 0
SHORTNESS OF BREATH: 0
ABDOMINAL PAIN: 1
DIARRHEA: 1
VOMITING: 1

## 2022-03-24 NOTE — PROGRESS NOTES
Department Of Internal Medicine  General Medicine/Primary Care  New Patient Acute Visit    Patient:  Kevin Mayers                                               : 1995  Age: 32 y.o. MRN: [id number]  Date : 3/24/2022    History Obtained From:  patient    REASON FOR VISIT:  ED follow-up    HISTORY OF PRESENT ILLNESS:   The patient is a 32 y.o. female who presents for ED follow-up. Patient presented to the ED 3/16 with complaints of abdominal pain, swollen lymph nodes, and fever. Mono was positive. Symptoms have not improved. Patient states that she is having difficulty taking much PO due to the severity of her nausea. She vomits essentially whatever she attempts to eat. Denies blood in the vomitus, states it looks like just bile. She has abdominal pain, primarily in RUQ but also in LUQ. She has been having night sweats, waking up with the bedsheets soaked in sweat. She feels fatigued and generally ill. She is urinating once per day and it is dark. She reports 1 loose BM per day as well. Reports getting lightheaded when standing for a few minutes    Past Medical History:    No past medical history on file. Past Surgical History:    No past surgical history on file. Family History:   No family history on file. Social History:   TOBACCO:   reports that she has never smoked. She has never used smokeless tobacco.  ETOH:   reports current alcohol use. Allergies:  Morphine and Ondansetron    Current Medications:    Prior to Admission medications    Not on File       ROS: Review of Systems - Per HPI    Physical Exam:      Vitals: There were no vitals taken for this visit. There is no height or weight on file to calculate BMI.   Wt Readings from Last 3 Encounters:   22 103 lb (46.7 kg)   21 103 lb (46.7 kg)       Physical Examination:   · General appearance: Appears uncomfortable and ill, but nontoxic, fully alert and orientated    · HEENT: Atraumatic, normocephalic, moist mucus membranes; no erythema  · Respiratory: Normal respiratory effort. No wheezes, rubs, or crackles  · Cardiovascular: tachycardic regular S1/S2, with no Murmur, rub or gallop  · Abdomen: Soft, non-distended, tender in RUQ worsened with deep inspiration, tender in LUQ mainly with deep inspiration; mild suprapubic pain  · Musculoskeletal: No clubbing, cyanosis, no lower extremity edema, peripheral pulses present, cap refill < 2sec  · Neurologic: Neurovascularly grossly intact without any focal motor deficits. Cranial nerves:  grossly non-focal.    LABS:    Chemistry:  No results for input(s): BUN, CREATININE, NA, K, CO2, CL, GLU, CA, MG, PHOS, AST, ALT, ALB, PROT in the last 72 hours. Invalid input(s): TBILI, DBILI, ALP, GLUFASTING    No results for input(s): ALKPHOS, ALT, AST, PROT, BILITOT, BILIDIR, LABALBU in the last 72 hours. No results found for: LABA1C  No results found for: EAG    No results for input(s): Chaudhari Due, LABMICR, MICROALBUR, Candy Samira in the last 72 hours. No results found for: TSHFT4, TSH    Hematology:  No results for input(s): WBC, HGB, HCT, PLT, MCV, MCH, MCHC, RDW, EOSABS in the last 72 hours. Invalid input(s): NEUTP, LYMPHP, MONOSP, EOSP, BASOP, NEUTABS, LYMPHABS, MONOABS, BASOABS    No results found for: IRON, TIBC, FERRITIN, FOLATE, WEOTXYCI11, PTH    Lipid:  No results found for: CHOL, HDL, LDLCALC, TRIG    U/A:  Lab Results   Component Value Date    LABMICR YES 03/16/2022       Imaging:   CT ABDOMEN PELVIS W IV CONTRAST Additional Contrast? None    Result Date: 3/16/2022  EXAM: CT Abdomen and Pelvis with Contrast INDICATION: Lower abdominal mass, reactive inguinal adenopathy, fever COMPARISON: None TECHNIQUE: Multiplanar reformatted images of the abdomen and pelvis are provided for review. Up-to-date CT equipment and radiation dose reduction techniques were employed.  IV Contrast: 80 mL, Isovue-370 Oral Contrast: None CT radiation dose optimization techniques (automated exposure control, use of a iterative reconstruction techniques, or adjustment of the mA or KV according to the patients size) were used to limit patient radiation dose. FINDINGS: Lung bases: Clear. Liver: Normal. Gallbladder and Biliary Tree: No calcified gallstones. Normal wall thickness. No intra- or extrahepatic biliary dilatation. Pancreas: Normal. Spleen: Normal. Adrenal Glands: Normal. Kidneys and Ureters: There is no obstructing urolithiasis or hydronephrosis. Renal parenchymal enhancement is symmetric. Urinary Bladder: Normal. Bowel: Normal diameter, nonobstructed. Appendix without thickening or enlargement. Reproductive Organs: No associated masses. Lymph Nodes: Visualize bilaterally with lymph nodes are noted. The largest is on the left measuring 14 x 15 mm. Bilateral external iliac chain nodes are also visualized. For example, there is a 9 mm short axis right external iliac chain node. Peritoneum/Retroperitoneum: No ascites or free air. Vessels: Aorta and IVC without significant abnormality. Splenic vein, SMV, PV and hepatic veins demonstrate enhancement. Abdominal Wall: Normal. Bones: No significant abnormality. Other Findings: None. 1. Visualized bilateral inguinal and external iliac chain nodes, as above. These are most likely reactive. Clinical correlation is necessary. Active Problems:     1. Infectious mononucleosis without complication, infectious mononucleosis due to unspecified organism         Assessment/Plan:     Mily Gonzalez is a 32 y.o. female     1.  Infectious mononucleosis without complication, infectious mononucleosis due to unspecified organism  -patient appears volume down; tachycardic to 115 at rest; lost 7lb in the last week  -Orthostats positive: HR jumped by 20bpm sitting to standing patient also became lightheaded and sweaty;  -Given her nausea/vomiting, doubt that she will be able to rehydrate adequately by PO.  -Will send patient to the ED for IV hydration and nausea control.  -Report called to Dr. Antony      Will attempt to have patient return for establishment of care as primary physician, if she so chooses. Case discussed with preceptor.   Sending directly to [facility] Emergency Department    Antoine Magallanes MD   Internal Medicine, PGY-2  3/24/2022 3:29 PM  Reach via [employer]

## 2022-03-24 NOTE — ED PROVIDER NOTES
ED Attending Attestation Note     Date of evaluation: 3/24/2022    This patient was seen by the advance practice provider. I have seen and examined the patient, agree with the workup, evaluation, management and diagnosis. The care plan has been discussed. My assessment reveals patient sent from clinic for dehydration. Orthostatic at clinic. 1L of fluid and improved. Will discharge.      Corbett Barthel, MD  03/24/22 Beulah Rivera

## 2022-03-24 NOTE — ED PROVIDER NOTES
1 [facility]  EMERGENCY DEPARTMENT ENCOUNTER          NURSE PRACTITIONER NOTE       Date of evaluation: 3/24/2022    Chief Complaint     Dehydration      History of Present Illness     Vicky Beebe is a 32 y.o. female with a past medical history of mononucleosis diagnosed on 3/16/2022 at this hospital when she was evaluated for abdominal pain, swollen lymph nodes and a fever; who presents to the emergency department with complaint of dehydration. Patient states she had a follow-up appointment at the [facility] clinic today, and was instructed to come to the emergency department due to concerns for dehydration and tachycardia. Patient states she has had daily vomiting and episodes of diarrhea for over a week, and feels very dehydrated. States she is unable to eat or drink anything without it coming out one end or the other. Denies blood in emesis or blood in stool. Complains of left upper quadrant abdominal pain/cramping and night sweats, fatigue and generally feels unwell. Denies chance of pregnancy, states she is currently on her cycle. Urinating once a day, states dark. States that she experienced nausea vomiting diarrhea for approximately 5 weeks after she had Covid-19 earlier this year, has been having the same symptoms since she was diagnosed with mono on 3/16/2022. Shares that she does not have any antiemetics at home. Review of Systems     Review of Systems   Constitutional: Positive for chills, diaphoresis, fatigue, fever and unexpected weight change. HENT: Negative. Respiratory: Negative for cough, chest tightness and shortness of breath. Cardiovascular: Negative for chest pain. Gastrointestinal: Positive for abdominal pain, diarrhea, nausea and vomiting. Genitourinary: Positive for decreased urine volume. Negative for dysuria, flank pain, frequency, pelvic pain and urgency. Skin: Negative. Neurological: Negative. Hematological: Does not bruise/bleed easily. Psychiatric/Behavioral: Negative. Past Medical, Surgical, Family, and Social History     She has no past medical history on file. She has no past surgical history on file. Her family history is not on file. She reports that she has never smoked. She has never used smokeless tobacco. She reports current alcohol use. She reports current drug use. Drug: Marijuana [employer]). Medications     Previous Medications    NONFORMULARY    Indications: contraceptive implant device left arm        Allergies     She is allergic to morphine and ondansetron. Physical Exam     INITIAL VITALS: BP: (!) 114/98, Temp: 98.5 °F (36.9 °C), Pulse: 84, Resp: 18, SpO2: 99 %   Physical Exam  Vitals and nursing note reviewed. Constitutional:       Appearance: Normal appearance. Cardiovascular:      Rate and Rhythm: Normal rate and regular rhythm. Pulses: Normal pulses. Heart sounds: Normal heart sounds. Pulmonary:      Effort: Pulmonary effort is normal. No respiratory distress. Breath sounds: Normal breath sounds. Abdominal:      General: Bowel sounds are normal. There is no distension. Palpations: Abdomen is soft. Tenderness: There is abdominal tenderness (epigastric and LUQ, no rigidity or guarding). Musculoskeletal:         General: Normal range of motion. Cervical back: Normal range of motion and neck supple. Skin:     General: Skin is warm and dry. Comments: Dry mucous membranes   Neurological:      Mental Status: She is alert and oriented to person, place, and time.    Psychiatric:         Mood and Affect: Mood normal.         Behavior: Behavior normal.         Diagnostic Results       RADIOLOGY:  No orders to display       LABS:   Results for orders placed or performed during the hospital encounter of 03/24/22   CBC with Auto Differential   Result Value Ref Range    WBC 10.3 4.0 - 11.0 K/uL    RBC 4.82 4.00 - 5.20 M/uL    Hemoglobin 13.9 12.0 - 16.0 g/dL    Hematocrit 41.0 36.0 - 48.0 %    MCV 85.2 80.0 - 100.0 fL    MCH 28.8 26.0 - 34.0 pg    MCHC 33.8 31.0 - 36.0 g/dL    RDW 14.5 12.4 - 15.4 %    Platelets 820 632 - 030 K/uL    MPV 6.9 5.0 - 10.5 fL    PLATELET SLIDE REVIEW Adequate     Path Consult Yes     Neutrophils % 24.0 %    Lymphocytes % 16.0 %    Monocytes % 11.0 %    Eosinophils % 0.0 %    Basophils % 1.0 %    Neutrophils Absolute 2.5 1.7 - 7.7 K/uL    Lymphocytes Absolute 6.2 (H) 1.0 - 5.1 K/uL    Monocytes Absolute 1.1 0.0 - 1.3 K/uL    Eosinophils Absolute 0.0 0.0 - 0.6 K/uL    Basophils Absolute 0.1 0.0 - 0.2 K/uL    Atypical Lymphocytes Relative 44 (H) 0 - 6 %    Blasts Relative 4 (A) %    Microcytes Occasional (A)    Basic Metabolic Panel   Result Value Ref Range    Sodium 139 136 - 145 mmol/L    Potassium 3.7 3.5 - 5.1 mmol/L    Chloride 103 99 - 110 mmol/L    CO2 23 21 - 32 mmol/L    Anion Gap 13 3 - 16    Glucose 91 70 - 99 mg/dL    BUN 8 7 - 20 mg/dL    CREATININE 0.5 (L) 0.6 - 1.1 mg/dL    GFR Non-African American >60 >60    GFR African American >60 >60    Calcium 8.9 8.3 - 10.6 mg/dL   Hepatic Function Panel   Result Value Ref Range    Total Protein 7.1 6.4 - 8.2 g/dL    Albumin 4.0 3.4 - 5.0 g/dL    Alkaline Phosphatase 774 (H) 40 - 129 U/L     (H) 10 - 40 U/L     (H) 15 - 37 U/L    Total Bilirubin 1.0 0.0 - 1.0 mg/dL    Bilirubin, Direct 0.4 (H) 0.0 - 0.3 mg/dL    Bilirubin, Indirect 0.6 0.0 - 1.0 mg/dL   Lipase   Result Value Ref Range    Lipase 17.0 13.0 - 60.0 U/L   Urinalysis   Result Value Ref Range    Color, UA Yellow Straw/Yellow    Clarity, UA Clear Clear    Glucose, Ur Negative Negative mg/dL    Bilirubin Urine MODERATE (A) Negative    Ketones, Urine 15 (A) Negative mg/dL    Specific Gravity, UA 1.020 1.005 - 1.030    Blood, Urine Negative Negative    pH, UA 6.5 5.0 - 8.0    Protein, UA 30 (A) Negative mg/dL    Urobilinogen, Urine 4.0 (A) <2.0 E.U./dL    Nitrite, Urine POSITIVE (A) Negative    Leukocyte Esterase, Urine TRACE (A) Negative Microscopic Examination YES     Urine Type Voided    Pregnancy, urine   Result Value Ref Range    HCG(Urine) Pregnancy Test Negative Detects HCG level >20 MIU/mL   Microscopic Urinalysis   Result Value Ref Range    Mucus, UA 2+ (A) None Seen /LPF    WBC, UA 6-9 (A) 0 - 5 /HPF    RBC, UA 3-4 0 - 4 /HPF    Epithelial Cells, UA 2-5 0 - 5 /HPF    Bacteria, UA 4+ (A) None Seen /HPF       RECENT VITALS:  BP: (!) 114/98, Temp: 98.5 °F (36.9 °C), Pulse: 84, Resp: 18, SpO2: 99 %       ED Course     Nursing Notes, Past Medical Hx, Past Surgical Hx, Social Hx, Allergies, and Family Hx were reviewed. The patient was given the following medications:  Orders Placed This Encounter   Medications    lactated ringers infusion 1,000 mL    metoclopramide (REGLAN) injection 10 mg    promethazine (PHENERGAN) 25 MG tablet     Sig: Take 1 tablet by mouth every 6 hours as needed for Nausea WARNING:  May cause drowsiness. May impair ability to operate vehicles or machinery. Do not use in combination with alcohol. Dispense:  20 tablet     Refill:  0            CONSULTS:  None    MEDICAL DECISION MAKING / ASSESSMENT / Shelia Night is a 32 y.o. female who presents with complaints as noted in HPI. Patient presents to the emergency department with a complaint of dehydration. Patient recently diagnosed with mononucleosis on 3/16/2022 when she was evaluated for abdominal pain, lymphadenopathy, and not feeling well. Has had daily nausea vomiting and diarrhea since then. [facility]. She did have left upper quadrant tenderness as well on my exam, had an IV placed, was given a liter of LR, Reglan for nausea control and a CBC, EP 1, LFTs, lipase and urinalysis/urine pregnancy were sent. CBC without leukocytosis, stable H&H. Pregnancy negative, urinalysis with 15 ketones, 6-9 WBCs, positive nitrates, 4+ bacteria. 2-5 epithelial cells noted.   Patient is asymptomatic at this time no indication for treatment until urine culture is completed; a urine culture will be sent. BMP with a stable sodium, potassium and creatinine. LFTs with an elevated alk phos of 774, ALT of 219, AST of 139. Patient with known mononucleosis. Will be instructed to follow-up with the [address] [place] [address] clinic for repeat LFTs to evaluate for stability. She did have a CT scan done on 3/16/2022 when she was last seen and this showed bilateral inguinal and external iliac chain lymphadenopathy, without acute abdominal pathology. No indication for repeat CT scan at this time. Patient was able to tolerate fluid without difficulty, no recurrent nausea or vomiting noted here. She was discharged home with a prescription for Phenergan as needed for recurrent nausea, instructions on hydration, and a bland diet. Patient was given strict return precautions as outlined in the AVS. Patient was agreeable and understanding to this plan of care. This patient was also evaluated by the attending physician. All care plans were discussed and agreed upon. Clinical Impression     1. Nausea and vomiting, intractability of vomiting not specified, unspecified vomiting type    2. Asymptomatic bacteriuria    3. Elevated LFTs        Disposition     PATIENT REFERRED TO:  [facility]  [id number]  [facility] [id number] [phone]      call for follow up in 2-3 weeks      DISCHARGE MEDICATIONS:  New Prescriptions    PROMETHAZINE (PHENERGAN) 25 MG TABLET    Take 1 tablet by mouth every 6 hours as needed for Nausea WARNING:  May cause drowsiness. May impair ability to operate vehicles or machinery. Do not use in combination with alcohol.        DISPOSITION  Home in stable condition        WINSOME Velazquez CNP  03/24/22 1951

## 2022-03-25 ENCOUNTER — CARE COORDINATION (OUTPATIENT)
Dept: OTHER | Facility: CLINIC | Age: 27
End: 2022-03-25

## 2022-03-25 LAB — HEMATOLOGY PATH CONSULT: NORMAL

## 2022-03-25 NOTE — ED NOTES
Patient prepared for and ready to be discharged. Patient discharged at this time in no acute distress after verbalizing understanding of discharge instructions. Patient left after receiving After Visit Summary instructions.         Tawana Bumpers, RN  03/24/22 2002

## 2022-03-25 NOTE — CARE COORDINATION
Ambulatory Care Coordination Note  3/25/2022  CM Risk Score: 4  Charlson 10 Year Mortality Risk Score: 2%     ACC: Navid Tavarez RN    Summary Note: [facility]) contacted the patient by telephone to follow up on progress, discuss new issues or concerns, and reinforce/ provide patient education. Verified name and  with patient as identifiers. Shelbi states that she is feeling \"much better today\". She reports the phenergan is suppressing her nausea and that she is able to tolerate PO while using phenergan. She notes a return to baseline in her urin output. Denies fever. ACM discussed Red Flags and s/s of dehydration and Mono and when to return to the [facility] or the ED. Patient is expected to have follow up labs in 2-3 weeks, she verbalized understanding and agrees to get follow up labs. ACM also informed patient on providers note to follow up with Dr. Kalina Michelle (at [facility]) to establish as primary care physician, she agreed to make follow up appt with the clinic/this provide to establish PCP when she gets her labs. She denies additional questions, concerns, or needs at this time. She is agreeable for ACM to continue to follow for symptom management/assessment. Reinforced/ Provided Education:  Discussed red flags and appropriate site of care based on symptoms and resources available to patient including: PCP  Benefits related nurse triage line  Urgent care clinics  MyChart Messaging  Condition related references. Importance and benefits of: Follow up with PCP and specialist, medication adherence, self monitoring and reporting of symptoms. Plan:  Continue weekly outreaches to provide telephonic support, education and resources as needed. Discuss / follow up on: Goal progress and scheduling lab follow up and PCP appt      Pt verbalized understanding and is agreeable to follow up contact.        Will make f/u appt with 2-3 weeks         Care Coordination Interventions    Program Enrollment: Rising Risk  Referral from Primary Care Provider: No  Suggested Interventions and Community Resources  Other Services or Interventions: Sent list of local PCP's          Goals Addressed    None         Prior to Admission medications    Medication Sig Start Date End Date Taking? Authorizing Provider   NONFORMULARY Indications: contraceptive implant device left arm     Historical Provider, MD   promethazine (PHENERGAN) 25 MG tablet Take 1 tablet by mouth every 6 hours as needed for Nausea WARNING:  May cause drowsiness. May impair ability to operate vehicles or machinery. Do not use in combination with alcohol. 3/24/22 3/31/22  Camelia Obrien, APRN - CNP       No future appointments.

## 2022-03-25 NOTE — ED NOTES
Patient prepared for and ready to be discharged. Patient discharged at this time to home in care of self in no acute distress after verbalizing understanding of discharge instructions. Patient left after receiving After Visit Summary instructions. IV removed prior to discharge.        Alannah Olivera RN  03/24/22 2000

## 2022-03-26 LAB
ORGANISM: ABNORMAL
URINE CULTURE, ROUTINE: ABNORMAL

## 2022-04-01 ENCOUNTER — CARE COORDINATION (OUTPATIENT)
Dept: OTHER | Facility: CLINIC | Age: 27
End: 2022-04-01

## 2022-04-01 NOTE — CARE COORDINATION
Ambulatory Care Coordination Note  4/1/2022  CM Risk Score: 4  Charlson 10 Year Mortality Risk Score: 2%     ACC: Stewart Erwin RN    Summary Note: ACM attempted to reach patient for follow up call regarding symptom management, need for f/u appt and reminder to get labs in 1-2 weeks. HIPAA compliant message left requesting a return phone call at patients convenience. Will continue to follow. Care Coordination Interventions    Program Enrollment: Rising Risk  Referral from Primary Care Provider: No  Suggested Interventions and Community Resources  Other Services or Interventions: Sent list of local PCP's        No future appointments.

## 2022-04-08 ENCOUNTER — CARE COORDINATION (OUTPATIENT)
Dept: OTHER | Facility: CLINIC | Age: 27
End: 2022-04-08

## 2022-04-08 NOTE — CARE COORDINATION
Ambulatory Care Coordination Note  2022  CM Risk Score: 4  Charlson 10 Year Mortality Risk Score: 2%     ACC: Ashley Augustine RN    Summary Note: [facility]) contacted the patient by telephone to follow up on progress, discuss new issues or concerns, and reinforce/ provide patient education. Verified name and  with patient as identifiers. ACM briefly spoke to patient who reports she is vacationing in [place] at time of call. She notes that all her Mono symptoms have resolved. She has not yet obtained her follow up lab work or scheduled an appt at the clinic for follow up/establishing with new PCP. ACM educated on importance of follow up labs and establishing with PCP. ACM will f/u with patient again in 2-3 weeks. Reinforced/ Provided Education:  Discussed red flags and appropriate site of care based on symptoms and resources available to patient including: PCP  Benefits related nurse triage line  Urgent care clinics  Provider home visits  MyChart Messaging  Condition related references. Education Documentation  General Self Care, taught by Ashley Augustine RN at 2022 12:09 PM.  Learner: Patient  Readiness: Acceptance  Method: Explanation  Response: Verbalizes Understanding  Comment: Educated on importance of self-care with PCP and lab follow up    Education Comments  No comments found. Importance and benefits of:Establishing with PCP and specialist, medication adherence, self monitoring and reporting of symptoms. Obtaining follow up labs. Plan:  Continue every 2-3 week outreaches to provide telephonic support, education and resources as needed. Discuss / follow up on: Goal progress and lab draw, access for ongoing CM needs. Pt verbalized understanding and is agreeable to follow up contact.           Care Coordination Interventions    Program Enrollment: Rising Risk  Referral from Primary Care Provider: No  Suggested Interventions and Community Resources  Other Services or Interventions: Sent list of local PCP's          Goals Addressed    None         Prior to Admission medications    Medication Sig Start Date End Date Taking? Authorizing Provider   NONFORMULARY Indications: contraceptive implant device left arm     Historical Provider, MD       No future appointments.

## 2022-04-28 ENCOUNTER — CARE COORDINATION (OUTPATIENT)
Dept: OTHER | Facility: CLINIC | Age: 27
End: 2022-04-28

## 2022-04-28 NOTE — CARE COORDINATION
Ambulatory Care Coordination Note  4/28/2022  CM Risk Score: 4  Charlson 10 Year Mortality Risk Score: 2%     ACC: Chloe Colon, RN    Summary Note: Associate Care Manager [facility]) sent [employer] message for Associate Care Management follow up related to need for follow up labs and establishing with new PCP. See patient message for details and response (as appropriate). Care Coordination Interventions    Program Enrollment: Rising Risk  Referral from Primary Care Provider: No  Suggested Interventions and Community Resources  Other Services or Interventions: Sent list of local PCP's          Goals Addressed                 This Visit's Progress     Establish with PCP   Not on track     Patient will establish with a Primary Care Provider     Barriers: lack of motivation and using outpatient clinic   Plan for overcoming my barriers: work with ACM to locate and schedule with a PCP. Work with ACM to understand the benefits of having a PCP, such as the ability to use [employer], VV, Tele visits, ect. Vs outpatient clinics or ED. Confidence: 8/10  Anticipated Goal Completion Date: 4/17/22    3/17/22: [facility] offered to call the Find a Provider Line to get her scheduled with the first available provider or send her a list of providers for her to review and select from. She chose the list of provides. [facility] sent list of PCP providers in her area, covered under her [facility] plan that are accepting new patients to patient via [employer] using the [address] and [facility] [address] [facility] a Doctor site. 4/8- Currently in [place], has not scheduled new pt PCP appt or f/u appt at [facility] nor obtained labs, states she will upon her return to [place].   4/28: No f/u labs or PCP visit to date. No future appointments.

## 2022-05-10 ENCOUNTER — CARE COORDINATION (OUTPATIENT)
Dept: OTHER | Facility: CLINIC | Age: 27
End: 2022-05-10

## 2022-05-10 NOTE — CARE COORDINATION
Ambulatory Care Coordination Note  5/10/2022  CM Risk Score: 4  Charlson 10 Year Mortality Risk Score: 2%     ACC: Ana Maria Perez RN    Summary Note: ACM attempted to reach patient for follow up call regarding need to f/u at clinic and/or establish with a new PCP. HIPAA compliant message left requesting a return phone call at patients convenience. Will continue to follow. Goals Addressed    None           No future appointments.

## 2022-05-18 ENCOUNTER — CARE COORDINATION (OUTPATIENT)
Dept: OTHER | Facility: CLINIC | Age: 27
End: 2022-05-18

## 2022-05-18 NOTE — CARE COORDINATION
4/17/22    3/17/22: [facility] offered to call the Find a Provider Line to get her scheduled with the first available provider or send her a list of providers for her to review and select from. She chose the list of provides. [facility] sent list of PCP providers in her area, covered under her [facility] plan that are accepting new patients to patient via [employer] using the [address] and [facility] [address] [facility] a Doctor site. 4/8- Currently in [place], has not scheduled new pt PCP appt or f/u appt at [facility] nor obtained labs, states she will upon her return to [place].   4/28: No f/u labs or PCP visit to date. 5/18- Patient has decided to move to [place], she has not returned home to follow up at the [facility] and establish with a PCP there. She has not establish with a provider in [place] as she is arranging her moving plans. [facility] reinforced the need to obtain follow up labs and establish with a provider asap, she verbalized understanding. If she keeps [facility] insurance [facility] offered to send her a list of providers in [place], she declined at this time but agreed to reach out if needed. Prior to Admission medications    Medication Sig Start Date End Date Taking? Authorizing Provider   NONFORMULARY Indications: contraceptive implant device left arm     Historical Provider, MD       No future appointments.

## 2024-02-21 ENCOUNTER — HOSPITAL ENCOUNTER (EMERGENCY)
Age: 29
Discharge: HOME | End: 2024-02-21
Attending: EMERGENCY MEDICINE

## 2024-02-21 VITALS
HEIGHT: 62 IN | WEIGHT: 105 LBS | HEART RATE: 98 BPM | RESPIRATION RATE: 18 BRPM | TEMPERATURE: 98 F | SYSTOLIC BLOOD PRESSURE: 121 MMHG | DIASTOLIC BLOOD PRESSURE: 80 MMHG | OXYGEN SATURATION: 100 % | BODY MASS INDEX: 19.32 KG/M2

## 2024-02-21 DIAGNOSIS — R10.30 LOWER ABDOMINAL PAIN: Primary | ICD-10-CM

## 2024-02-21 LAB
ALBUMIN SERPL-MCNC: 4.5 G/DL (ref 3.4–5)
ALBUMIN/GLOB SERPL: 1.8 {RATIO} (ref 1.1–2.2)
ALP SERPL-CCNC: 43 U/L (ref 40–129)
ALT SERPL-CCNC: 8 U/L (ref 10–40)
ANION GAP SERPL CALCULATED.3IONS-SCNC: 13 MMOL/L (ref 3–16)
AST SERPL-CCNC: 12 U/L (ref 15–37)
BASOPHILS # BLD: 0 K/UL (ref 0–0.2)
BASOPHILS NFR BLD: 0.7 %
BILIRUB SERPL-MCNC: <0.2 MG/DL (ref 0–1)
BILIRUB UR QL STRIP.AUTO: NEGATIVE
BUN SERPL-MCNC: 13 MG/DL (ref 7–20)
CALCIUM SERPL-MCNC: 9.4 MG/DL (ref 8.3–10.6)
CHLORIDE SERPL-SCNC: 109 MMOL/L (ref 99–110)
CLARITY UR: CLEAR
CO2 SERPL-SCNC: 21 MMOL/L (ref 21–32)
COLOR UR: YELLOW
CREAT SERPL-MCNC: 0.7 MG/DL (ref 0.6–1.1)
DEPRECATED RDW RBC AUTO: 13.9 % (ref 12.4–15.4)
EOSINOPHIL # BLD: 0.2 K/UL (ref 0–0.6)
EOSINOPHIL NFR BLD: 2.8 %
GFR SERPLBLD CREATININE-BSD FMLA CKD-EPI: >60 ML/MIN/{1.73_M2}
GLUCOSE SERPL-MCNC: 106 MG/DL (ref 70–99)
GLUCOSE UR STRIP.AUTO-MCNC: NEGATIVE MG/DL
HCG UR QL: NEGATIVE
HCT VFR BLD AUTO: 39 % (ref 36–48)
HGB BLD-MCNC: 13.2 G/DL (ref 12–16)
HGB UR QL STRIP.AUTO: NEGATIVE
KETONES UR STRIP.AUTO-MCNC: NEGATIVE MG/DL
LEUKOCYTE ESTERASE UR QL STRIP.AUTO: NEGATIVE
LIPASE SERPL-CCNC: 33 U/L (ref 13–60)
LYMPHOCYTES # BLD: 1.3 K/UL (ref 1–5.1)
LYMPHOCYTES NFR BLD: 23.1 %
MCH RBC QN AUTO: 31 PG (ref 26–34)
MCHC RBC AUTO-ENTMCNC: 33.7 G/DL (ref 31–36)
MCV RBC AUTO: 91.7 FL (ref 80–100)
MONOCYTES # BLD: 0.4 K/UL (ref 0–1.3)
MONOCYTES NFR BLD: 7 %
NEUTROPHILS # BLD: 3.7 K/UL (ref 1.7–7.7)
NEUTROPHILS NFR BLD: 66.4 %
NITRITE UR QL STRIP.AUTO: NEGATIVE
PH UR STRIP.AUTO: 6 [PH] (ref 5–8)
PLATELET # BLD AUTO: 235 K/UL (ref 135–450)
PMV BLD AUTO: 6.4 FL (ref 5–10.5)
POTASSIUM SERPL-SCNC: 3.9 MMOL/L (ref 3.5–5.1)
PROT SERPL-MCNC: 7 G/DL (ref 6.4–8.2)
PROT UR STRIP.AUTO-MCNC: NEGATIVE MG/DL
RBC # BLD AUTO: 4.25 M/UL (ref 4–5.2)
SODIUM SERPL-SCNC: 143 MMOL/L (ref 136–145)
SP GR UR STRIP.AUTO: 1.01 (ref 1–1.03)
UA COMPLETE W REFLEX CULTURE PNL UR: NORMAL
UA DIPSTICK W REFLEX MICRO PNL UR: NORMAL
URN SPEC COLLECT METH UR: NORMAL
UROBILINOGEN UR STRIP-ACNC: 0.2 E.U./DL
WBC # BLD AUTO: 5.5 K/UL (ref 4–11)

## 2024-02-21 PROCEDURE — 81003 URINALYSIS AUTO W/O SCOPE: CPT

## 2024-02-21 PROCEDURE — 85025 COMPLETE CBC W/AUTO DIFF WBC: CPT

## 2024-02-21 PROCEDURE — 83690 ASSAY OF LIPASE: CPT

## 2024-02-21 PROCEDURE — 6370000000 HC RX 637 (ALT 250 FOR IP): Performed by: PHYSICIAN ASSISTANT

## 2024-02-21 PROCEDURE — 84703 CHORIONIC GONADOTROPIN ASSAY: CPT

## 2024-02-21 PROCEDURE — 80053 COMPREHEN METABOLIC PANEL: CPT

## 2024-02-21 PROCEDURE — 36415 COLL VENOUS BLD VENIPUNCTURE: CPT

## 2024-02-21 PROCEDURE — 99284 EMERGENCY DEPT VISIT MOD MDM: CPT

## 2024-02-21 RX ORDER — DICYCLOMINE HCL 20 MG
20 TABLET ORAL EVERY 6 HOURS PRN
Qty: 30 TABLET | Refills: 0 | Status: SHIPPED | OUTPATIENT
Start: 2024-02-21

## 2024-02-21 RX ORDER — ACETAMINOPHEN 500 MG
1000 TABLET ORAL
Status: COMPLETED | OUTPATIENT
Start: 2024-02-21 | End: 2024-02-21

## 2024-02-21 RX ORDER — ACETAMINOPHEN 325 MG/1
650 TABLET ORAL EVERY 6 HOURS PRN
Qty: 60 TABLET | Refills: 0 | Status: SHIPPED | OUTPATIENT
Start: 2024-02-21

## 2024-02-21 RX ORDER — DICYCLOMINE HCL 20 MG
20 TABLET ORAL ONCE
Status: COMPLETED | OUTPATIENT
Start: 2024-02-21 | End: 2024-02-21

## 2024-02-21 RX ADMIN — ACETAMINOPHEN 1000 MG: 500 TABLET ORAL at 20:20

## 2024-02-21 RX ADMIN — DICYCLOMINE HYDROCHLORIDE 20 MG: 20 TABLET ORAL at 20:20

## 2024-02-21 ASSESSMENT — PAIN DESCRIPTION - LOCATION
LOCATION: ABDOMEN
LOCATION: ABDOMEN

## 2024-02-21 ASSESSMENT — ENCOUNTER SYMPTOMS
BLOOD IN STOOL: 1
RESPIRATORY NEGATIVE: 1
DIARRHEA: 0
VOMITING: 0
NAUSEA: 0
ABDOMINAL PAIN: 1

## 2024-02-21 ASSESSMENT — PAIN SCALES - GENERAL
PAINLEVEL_OUTOF10: 5
PAINLEVEL_OUTOF10: 5

## 2024-02-21 ASSESSMENT — PAIN DESCRIPTION - ORIENTATION: ORIENTATION: LOWER;LEFT;RIGHT

## 2024-02-21 ASSESSMENT — PAIN - FUNCTIONAL ASSESSMENT: PAIN_FUNCTIONAL_ASSESSMENT: 0-10

## 2024-02-21 NOTE — ED TRIAGE NOTES
[facility] Emergency Department  MEDICAL SCREENING EXAM    Date of Service: 2/21/2024    Reason for Visit: No chief complaint on file.        Patient History, Brief Exam, and Initial Assessment     Abbreviated HPI: Shelbi Khan is a 28 y.o. female presenting with rectal bleeding and abdominal pain. Bleeding started last night - in toilet bowl and clots on TP when she whiles. Also having intense lower abd cramping. No F/C. No vomiting. Hx hemorrhoids and fissures. LMP ~2/13, ended 3 days ago.    INITIAL VITALS:  ,  ,  ,  ,      Plan     Patient was evaluated in the REU for a medical screening exam.  To further evaluate the presenting complaints, the following orders have been placed:  Orders Placed This Encounter   Procedures    CBC with Auto Differential    CMP w/ Reflex to MG    Lipase    Urinalysis with Reflex to Culture    Urine Preg (Lab)        See primary provider's note for full details and final disposition.     Current Facility-Administered Medications:   No orders of the defined types were placed in this encounter.        REU Dispo     Stable for lobby while awaiting ED bed      Relevant Medical History     No past medical history on file.  No past surgical history on file.  Allergies   Allergen Reactions    Morphine Nausea And Vomiting    Ondansetron Nausea And Vomiting

## 2024-02-22 NOTE — ED PROVIDER NOTES
ED Attending Attestation Note     Date of evaluation: 2/21/2024    This patient was seen by the CAROLEE.  I have seen and examined the patient, agree with the workup, evaluation, management and diagnosis. The care plan has been discussed. I was present for any procedures performed in the CAROLEE's note and have made edits to the note where appropriate.    My assessment reveals 28 y.o. female presenting for rectal bleeding as well as lower abdominal pain.  Here she is alert, in no distress, abdomen is soft with mild tenderness across the bilateral lower quadrants and suprapubic region.  No masses, no peritoneal signs.  Labs are overall reassuring.  She is hemodynamically stable.         Evangelista Andrews MD  02/21/24 [id number]    
Negative    Ketones, Urine Negative Negative mg/dL    Specific Gravity, UA 1.010 1.005 - 1.030    Blood, Urine Negative Negative    pH, UA 6.0 5.0 - 8.0    Protein, UA Negative Negative mg/dL    Urobilinogen, Urine 0.2 <2.0 E.U./dL    Nitrite, Urine Negative Negative    Leukocyte Esterase, Urine Negative Negative    Microscopic Examination Not Indicated     Urine Type NotGiven     Urine Reflex to Culture Not Indicated    Urine Preg (Lab)   Result Value Ref Range    HCG(Urine) Pregnancy Test Negative Detects HCG level >20 MIU/mL         ED BEDSIDE ULTRASOUND:  No results found.    RECENT VITALS:  BP: 121/80, Temp: 98 °F (36.7 °C), Pulse: 98, Respirations: 18, SpO2: 100 %     Procedures         ED Course     Nursing Notes, Past Medical Hx,Past Surgical Hx, Social Hx, Allergies, and Family Hx were reviewed.         The patient was given the following medications:  Orders Placed This Encounter   Medications    dicyclomine (BENTYL) tablet 20 mg    acetaminophen (TYLENOL) tablet 1,000 mg       CONSULTS:  None    Review of Systems     Review of Systems   Constitutional: Negative.  Negative for fever.   Respiratory: Negative.     Cardiovascular: Negative.    Gastrointestinal:  Positive for abdominal pain and blood in stool. Negative for diarrhea, nausea and vomiting.   Musculoskeletal: Negative.    Skin: Negative.    Neurological: Negative.    Psychiatric/Behavioral: Negative.     All other systems reviewed and are negative.      Past Medical, Surgical, Family, and Social History     She has no past medical history on file.  She has no past surgical history on file.  Her family history is not on file.  She reports that she has never smoked. She has never used smokeless tobacco. She reports current alcohol use. She reports current drug use. Drug: Marijuana (Weed).    Medications     Previous Medications    NONFORMULARY    Indications: contraceptive implant device left arm        Allergies     She is allergic to morphine and

## 2025-04-09 ENCOUNTER — CARE COORDINATION (OUTPATIENT)
Dept: OTHER | Facility: CLINIC | Age: 30
End: 2025-04-09
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
## 2025-04-11 ENCOUNTER — CARE COORDINATION (OUTPATIENT)
Dept: OTHER | Facility: CLINIC | Age: 30
End: 2025-04-11
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
